# Patient Record
Sex: FEMALE | Race: WHITE | Employment: UNEMPLOYED | URBAN - METROPOLITAN AREA
[De-identification: names, ages, dates, MRNs, and addresses within clinical notes are randomized per-mention and may not be internally consistent; named-entity substitution may affect disease eponyms.]

---

## 2023-11-26 DIAGNOSIS — Z00.00 HEALTHCARE MAINTENANCE: Primary | ICD-10-CM

## 2023-11-27 ENCOUNTER — TELEPHONE (OUTPATIENT)
Dept: GASTROENTEROLOGY | Facility: CLINIC | Age: 48
End: 2023-11-27
Payer: COMMERCIAL

## 2023-11-27 DIAGNOSIS — K51.90 ULCERATIVE COLITIS WITHOUT COMPLICATIONS, UNSPECIFIED LOCATION (MULTI): Primary | ICD-10-CM

## 2023-11-27 RX ORDER — NITROFURANTOIN MACROCRYSTALS 50 MG/1
CAPSULE ORAL
Qty: 30 CAPSULE | Refills: 5 | Status: SHIPPED | OUTPATIENT
Start: 2023-11-27

## 2023-11-28 RX ORDER — MESALAMINE 4 G/60ML
4 SUSPENSION RECTAL NIGHTLY
Qty: 1800 ML | Refills: 1 | Status: SHIPPED | OUTPATIENT
Start: 2023-11-28 | End: 2024-01-16 | Stop reason: SDUPTHER

## 2023-11-28 RX ORDER — MESALAMINE 400 MG/1
800 CAPSULE, DELAYED RELEASE ORAL 3 TIMES DAILY
Qty: 180 CAPSULE | Refills: 1 | Status: SHIPPED | OUTPATIENT
Start: 2023-11-28 | End: 2023-12-28 | Stop reason: SDUPTHER

## 2023-12-04 ENCOUNTER — TELEPHONE (OUTPATIENT)
Dept: GASTROENTEROLOGY | Facility: CLINIC | Age: 48
End: 2023-12-04
Payer: COMMERCIAL

## 2023-12-04 NOTE — TELEPHONE ENCOUNTER
Pt states that she is having a flare up colitis. She is currently taking Mesalamine and states it is not enough. It is not working. She states that she is having blood and mucus and is taking 6 pills a day. She wants to know what could be done? She has an appt 1/16.

## 2023-12-28 DIAGNOSIS — K51.90 ULCERATIVE COLITIS WITHOUT COMPLICATIONS, UNSPECIFIED LOCATION (MULTI): ICD-10-CM

## 2023-12-29 RX ORDER — MESALAMINE 400 MG/1
800 CAPSULE, DELAYED RELEASE ORAL 3 TIMES DAILY
Qty: 180 CAPSULE | Refills: 0 | Status: SHIPPED | OUTPATIENT
Start: 2023-12-29 | End: 2024-01-16 | Stop reason: SDUPTHER

## 2024-01-16 ENCOUNTER — OFFICE VISIT (OUTPATIENT)
Dept: GASTROENTEROLOGY | Facility: CLINIC | Age: 49
End: 2024-01-16
Payer: COMMERCIAL

## 2024-01-16 VITALS — HEIGHT: 70 IN | WEIGHT: 158 LBS | BODY MASS INDEX: 22.62 KG/M2

## 2024-01-16 DIAGNOSIS — K51.90 ULCERATIVE COLITIS WITHOUT COMPLICATIONS, UNSPECIFIED LOCATION (MULTI): ICD-10-CM

## 2024-01-16 PROCEDURE — 99214 OFFICE O/P EST MOD 30 MIN: CPT | Performed by: INTERNAL MEDICINE

## 2024-01-16 RX ORDER — MESALAMINE 400 MG/1
800 CAPSULE, DELAYED RELEASE ORAL 3 TIMES DAILY
Qty: 540 CAPSULE | Refills: 3 | Status: SHIPPED | OUTPATIENT
Start: 2024-01-16 | End: 2025-01-15

## 2024-01-16 RX ORDER — MESALAMINE 4 G/60ML
4 SUSPENSION RECTAL NIGHTLY
Qty: 1800 ML | Refills: 5 | Status: SHIPPED | OUTPATIENT
Start: 2024-01-16 | End: 2024-07-14

## 2024-01-16 RX ORDER — BUDESONIDE 9 MG/1
9 TABLET, FILM COATED, EXTENDED RELEASE ORAL DAILY
Qty: 30 TABLET | Refills: 1 | Status: SHIPPED | OUTPATIENT
Start: 2024-01-16 | End: 2024-03-15 | Stop reason: SDUPTHER

## 2024-01-16 ASSESSMENT — ENCOUNTER SYMPTOMS
ROS GI COMMENTS: AS PER HPI
MYALGIAS: 0
FATIGUE: 0
UNEXPECTED WEIGHT CHANGE: 0
DIFFICULTY URINATING: 0
HEADACHES: 0
FEVER: 0
ARTHRALGIAS: 0
CHILLS: 0
SHORTNESS OF BREATH: 0

## 2024-01-16 NOTE — PATIENT INSTRUCTIONS
I renewed the mesalamine enemas and pills.    Try the Uceris (budesonide) for at least 30 days to see if this will improve symptoms even further.    We can talk about switching to a medication like Entyvio if symptoms don't get better.

## 2024-01-16 NOTE — PROGRESS NOTES
Assessment/Plan    Yaneth Cordero is a 48 y.o. female who presents to GI clinic for follow up for ulcerative colitis.    Ulcerative colitis without complications (CMS/HCC)  She had been stable on mesalamine (Delzicol) until around a month ago when she developed a flare, with bloody stools and tenesmus with urgency.  We started mesalamine enemas on top of the Delzicol, which has helped symptomatically.  However she has noticed that she has been able to tolerate fewer and fewer foods, and is wondering if this is related to uncontrolled UC, which is certainly possible.  She asked about Entyvio, and we discussed the differences between Delzicol and Entyvio.  At this point I am unsure if her ongoing trouble tolerating many foods is due to active UC, or could be due to IBS symptoms or just food intolerance in the setting of controlled UC.  We discussed doing a course of Uceris (colon-acting budesonide) to see if this helps to improve her symptoms further.  - wrote script for Uceris 9mg ER x30 days with one refill  - renewed Delzicol and also mesalamine enemas  - she will update us after a month on the Uceris to see if there is any improvement in her symptoms  - if she does have recurrence of symptoms after finishing Uceris, then we could discuss changing therapy to a medication such as Entyvio         Subjective     History of Present Illness   Yaneth Cordero is a 48 y.o. female with PMHx of UC, frequent UTIs, congenital kidney disease s/p bladder reconstruction who presents to GI clinic for follow up.  Last seen over a year ago (Nov 2022) to establish care for her UC after her previous GI doctor left the system.  At that time she was doing fairly well on Delzicol and mesalamine suppositories, with some mild symptoms of urgency.  She called last month due to concern for flare (was only on Delzicol at the time) so we sent in mesalamine enemas (suppositories were not covered by insurance)  Enemas helped  immensely  Took for a month, just finishing this and is feeling better  However has noticed that more foods are bothering her stomach and causing symptoms  It feels like she is able to tolerate fewer and fewer foods - lately has been eating mostly the same things such as rice and chicken  She saw online that lots of people are taking Entyvio and doing well, so she asked about this - we discussed the differences between medications like mesalamine and the biologics/infusion medications    Past history:  Diagnosed around 2015, took mesalamine for 6 months then stopped  Has been on Delzicol - takes 4 per day, then 6 if she is having a flare (but script is only for 4 per day)  Also uses suppositories when she is having a flare  Symptoms were very bad last year - had been eating bagged lettuce, also garlic sets off the symptoms  Flare = rectal pressure with mucous and blood  Can get pain depending on what she eats  Last flare has been slight, going on for around 3 weeks, combination of Delzicol and mesalamine suppositories seems to be helping  Colonoscopy 7/2020 ([facility]) was endoscopically normal, biopsies showed only one area of very minimal inactive inflammation  Last saw Dr. Gustafson 2 months ago due to ongoing symptoms despite Delzicol, stool tests were ordered but were not done - they also discussed adding mesalamine suppositories    Review of Systems  Review of Systems   Constitutional:  Negative for chills, fatigue, fever and unexpected weight change.   Respiratory:  Negative for shortness of breath.    Cardiovascular:  Negative for chest pain.   Gastrointestinal:         As per HPI   Genitourinary:  Negative for difficulty urinating.   Musculoskeletal:  Negative for arthralgias and myalgias.   Neurological:  Negative for headaches.   All other systems reviewed and are negative.      Allergies  Allergies   Allergen Reactions    Penicillins Rash       Medications  Current Outpatient Medications   Medication  "Instructions    budesonide ER (UCERIS) 9 mg, oral, Daily    mesalamine (DELZICOL) 800 mg, oral, 3 times daily    mesalamine (ROWASA) 4 g, rectal, Nightly, Retain as long as possible, up to 8 hours    nitrofurantoin (Macrodantin) 50 mg capsule TAKE ONE CAPSULE BY MOUTH AS DIRECTED AFTER INTERCOURSE        Objective     Visit Vitals  Ht 1.778 m (5' 10\")   Wt 71.7 kg (158 lb)   BMI 22.67 kg/m²   BSA 1.88 m²       Physical Exam  Constitutional:       General: She is not in acute distress.  Eyes:      Extraocular Movements: Extraocular movements intact.   Skin:     General: Skin is warm and dry.   Neurological:      General: No focal deficit present.      Mental Status: She is alert.   Psychiatric:         Mood and Affect: Mood normal.         Behavior: Behavior normal.           Lab Results   Component Value Date    WBC 9.2 12/27/2022    WBC 6.8 06/14/2021    HGB 14.0 12/27/2022    HGB 13.8 06/14/2021    HCT 43.6 12/27/2022    HCT 43.3 06/14/2021    MCV 95 12/27/2022    MCV 98 06/14/2021     12/27/2022     06/14/2021     Lab Results   Component Value Date     12/27/2022     06/14/2021    K 4.1 12/27/2022    K 4.9 06/14/2021     12/27/2022     06/14/2021    CO2 30 12/27/2022    CO2 28 06/14/2021    BUN 15 12/27/2022    BUN 18 06/14/2021    CREATININE 0.80 12/27/2022    CREATININE 0.74 06/14/2021    CALCIUM 10.0 12/27/2022    CALCIUM 10.2 06/14/2021    PROT 7.4 12/27/2022    PROT 6.9 06/14/2021    BILITOT 1.1 12/27/2022    BILITOT 0.6 06/14/2021    ALKPHOS 59 12/27/2022    ALKPHOS 56 06/14/2021    ALT 16 12/27/2022    ALT 28 06/14/2021    AST 19 12/27/2022    AST 22 06/14/2021    GLUCOSE 101 (H) 12/27/2022    GLUCOSE 88 06/14/2021       Recent Imaging  No results found.      Jeffery Rodriguez MD         "

## 2024-01-17 PROBLEM — K51.90 ULCERATIVE COLITIS WITHOUT COMPLICATIONS (MULTI): Status: ACTIVE | Noted: 2024-01-17

## 2024-01-17 NOTE — ASSESSMENT & PLAN NOTE
She had been stable on mesalamine (Delzicol) until around a month ago when she developed a flare, with bloody stools and tenesmus with urgency.  We started mesalamine enemas on top of the Delzicol, which has helped symptomatically.  However she has noticed that she has been able to tolerate fewer and fewer foods, and is wondering if this is related to uncontrolled UC, which is certainly possible.  She asked about Entyvio, and we discussed the differences between Delzicol and Entyvio.  At this point I am unsure if her ongoing trouble tolerating many foods is due to active UC, or could be due to IBS symptoms or just food intolerance in the setting of controlled UC.  We discussed doing a course of Uceris (colon-acting budesonide) to see if this helps to improve her symptoms further.  - wrote script for Uceris 9mg ER x30 days with one refill  - renewed Delzicol and also mesalamine enemas  - she will update us after a month on the Uceris to see if there is any improvement in her symptoms  - if she does have recurrence of symptoms after finishing Uceris, then we could discuss changing therapy to a medication such as Entyvio

## 2024-03-15 DIAGNOSIS — K51.90 ULCERATIVE COLITIS WITHOUT COMPLICATIONS, UNSPECIFIED LOCATION (MULTI): ICD-10-CM

## 2024-03-15 RX ORDER — BUDESONIDE 9 MG/1
9 TABLET, FILM COATED, EXTENDED RELEASE ORAL DAILY
Qty: 90 TABLET | Refills: 0 | Status: SHIPPED | OUTPATIENT
Start: 2024-03-15 | End: 2024-06-13

## 2025-03-23 DIAGNOSIS — K51.90 ULCERATIVE COLITIS WITHOUT COMPLICATIONS, UNSPECIFIED LOCATION (MULTI): ICD-10-CM

## 2025-03-27 ENCOUNTER — OFFICE VISIT (OUTPATIENT)
Dept: GASTROENTEROLOGY | Facility: CLINIC | Age: 50
End: 2025-03-27
Payer: COMMERCIAL

## 2025-03-27 VITALS — HEIGHT: 70 IN | OXYGEN SATURATION: 97 % | BODY MASS INDEX: 21.47 KG/M2 | HEART RATE: 73 BPM | WEIGHT: 150 LBS

## 2025-03-27 DIAGNOSIS — K51.90 ULCERATIVE COLITIS WITHOUT COMPLICATIONS, UNSPECIFIED LOCATION (MULTI): Primary | ICD-10-CM

## 2025-03-27 DIAGNOSIS — R14.1 GAS PAIN: ICD-10-CM

## 2025-03-27 PROCEDURE — 3008F BODY MASS INDEX DOCD: CPT

## 2025-03-27 PROCEDURE — 99214 OFFICE O/P EST MOD 30 MIN: CPT

## 2025-03-27 PROCEDURE — 1036F TOBACCO NON-USER: CPT

## 2025-03-27 RX ORDER — FAMOTIDINE 40 MG/1
40 TABLET, FILM COATED ORAL DAILY
Qty: 30 TABLET | Refills: 11 | Status: SHIPPED | OUTPATIENT
Start: 2025-03-27 | End: 2026-03-27

## 2025-03-27 RX ORDER — MESALAMINE 4 G/60ML
4 SUSPENSION RECTAL NIGHTLY
Qty: 1800 ML | Refills: 5 | Status: SHIPPED | OUTPATIENT
Start: 2025-03-27 | End: 2025-09-23

## 2025-03-27 RX ORDER — SODIUM, POTASSIUM,MAG SULFATES 17.5-3.13G
1 SOLUTION, RECONSTITUTED, ORAL ORAL ONCE
Qty: 1 EACH | Refills: 0 | Status: SHIPPED | OUTPATIENT
Start: 2025-03-27 | End: 2025-03-27

## 2025-03-27 RX ORDER — MESALAMINE 400 MG/1
800 CAPSULE, DELAYED RELEASE ORAL 3 TIMES DAILY
Qty: 180 CAPSULE | Refills: 11 | Status: SHIPPED | OUTPATIENT
Start: 2025-03-27 | End: 2026-03-27

## 2025-03-27 ASSESSMENT — ENCOUNTER SYMPTOMS
FATIGUE: 0
ANAL BLEEDING: 0
RECTAL PAIN: 0
TROUBLE SWALLOWING: 0
CHILLS: 0
ABDOMINAL PAIN: 1
COUGH: 0
CONSTIPATION: 0
NAUSEA: 0
ABDOMINAL DISTENTION: 1
SHORTNESS OF BREATH: 0
BLOOD IN STOOL: 0
VOMITING: 0
APPETITE CHANGE: 0
FEVER: 0
DIARRHEA: 0

## 2025-03-27 NOTE — ASSESSMENT & PLAN NOTE
Gas pain with epigastric/LUQ pain likely correlates with GERD.  Consider IBS or SIBO  -40 mg Pepcid daily  -Consider PPI or hydrogen breath test

## 2025-03-27 NOTE — ASSESSMENT & PLAN NOTE
-Continue current dose of mesalamine 800 mg 3 times daily  -As needed mesalamine enemas  -CBC, CMP, CRP, ESR ordered  -Schedule colonoscopy    Follow-up annually or as needed

## 2025-03-27 NOTE — PROGRESS NOTES
Subjective     History of Present Illness:   Yaneth Cordero is a 49 y.o. female with PMHx of ulcerative colitis who presents to GI clinic for medication refill    Today, patient states she feels okay on current medications.  Still has a lot of gas and bloating, constant.  Takes gas-x up to 8 pills daily which does not provide relief.  Tried Imodium w/ gas relief which did slightly help.  Eats and has gas immediately. Moves bowels daily with one formed stool. Gets full easily.  Denies constipation, diarrhea, melena, hematochezia, dysphagia, unintentional weight loss    Social ETOH, denies smoking or marijuana  Denies fxh GI cancer.  Fhx IBD- 1/2 sister crohn's  Abdominal Surgeries: bladder surgery    Last colonoscopy 7/2020 Dr. Gustafson for UC pancolitis: Unremarkable.  Repeat 1 year.  Negative pathology  Colonoscopy 5/2015: Ulcerative colitis rectum to 35 cm  No history of EGD       Past Medical History  As per HPI.     Social History  she  reports that she has never smoked. She has never used smokeless tobacco. She reports current alcohol use. She reports that she does not use drugs.     Family History  her family history is not on file.     Review of Systems  Review of Systems   Constitutional:  Negative for appetite change, chills, fatigue and fever.   HENT:  Negative for trouble swallowing.    Respiratory:  Negative for cough and shortness of breath.    Gastrointestinal:  Positive for abdominal distention and abdominal pain. Negative for anal bleeding, blood in stool, constipation, diarrhea, nausea, rectal pain and vomiting.       Allergies  Allergies   Allergen Reactions    Penicillins Rash       Medications  Current Outpatient Medications   Medication Instructions    budesonide ER (UCERIS) 9 mg, oral, Daily    famotidine (PEPCID) 40 mg, oral, Daily    mesalamine (DELZICOL) 800 mg, oral, 3 times daily    mesalamine (ROWASA) 4 g, rectal, Nightly, Retain as long as possible, up to 8 hours    nitrofurantoin  (Macrodantin) 50 mg capsule TAKE ONE CAPSULE BY MOUTH AS DIRECTED AFTER INTERCOURSE    sodium,potassium,mag sulfates (Suprep Bowel Prep Kit) 17.5-3.13-1.6 gram solution 2 bottles, oral, Once, Take as directed.        Objective   Visit Vitals  Pulse 73      Physical Exam  Constitutional:       Appearance: Normal appearance. She is normal weight.   HENT:      Mouth/Throat:      Mouth: Mucous membranes are dry.      Pharynx: Oropharynx is clear.   Cardiovascular:      Rate and Rhythm: Normal rate and regular rhythm.   Pulmonary:      Effort: Pulmonary effort is normal.      Breath sounds: Normal breath sounds. No wheezing or rhonchi.   Abdominal:      General: Abdomen is flat. Bowel sounds are normal. There is no distension.      Palpations: Abdomen is soft. There is no hepatomegaly.      Tenderness: There is abdominal tenderness (Epigastric, LUQ). There is no guarding or rebound. Negative signs include Stewart's sign.      Hernia: No hernia is present.   Musculoskeletal:         General: Normal range of motion.   Skin:     General: Skin is warm and dry.   Neurological:      General: No focal deficit present.      Mental Status: She is alert and oriented to person, place, and time.   Psychiatric:         Mood and Affect: Mood normal.         Behavior: Behavior normal.           Lab Results   Component Value Date    WBC 9.2 12/27/2022    WBC 6.8 06/14/2021    WBC 7.3 05/07/2020    HGB 14.0 12/27/2022    HGB 13.8 06/14/2021    HGB 13.2 05/07/2020    HCT 43.6 12/27/2022    HCT 43.3 06/14/2021    HCT 39.5 05/07/2020     12/27/2022     06/14/2021     05/07/2020     Lab Results   Component Value Date     12/27/2022     06/14/2021     05/07/2020    K 4.1 12/27/2022    K 4.9 06/14/2021    K 4.3 05/07/2020     12/27/2022     06/14/2021     05/07/2020    CO2 30 12/27/2022    CO2 28 06/14/2021    CO2 31 05/07/2020    BUN 15 12/27/2022    BUN 18 06/14/2021    BUN 14  05/07/2020    CREATININE 0.80 12/27/2022    CREATININE 0.74 06/14/2021    CREATININE 0.75 05/07/2020    CALCIUM 10.0 12/27/2022    CALCIUM 10.2 06/14/2021    CALCIUM 9.7 05/07/2020    PROT 7.4 12/27/2022    PROT 6.9 06/14/2021    PROT 7.0 05/07/2020    BILITOT 1.1 12/27/2022    BILITOT 0.6 06/14/2021    BILITOT 0.7 05/07/2020    ALKPHOS 59 12/27/2022    ALKPHOS 56 06/14/2021    ALKPHOS 44 05/07/2020    ALT 16 12/27/2022    ALT 28 06/14/2021    ALT 20 05/07/2020    AST 19 12/27/2022    AST 22 06/14/2021    AST 18 05/07/2020    GLUCOSE 101 (H) 12/27/2022    GLUCOSE 88 06/14/2021    GLUCOSE 79 05/07/2020           Yaneth Cordero is a 49 y.o. female who presents to GI clinic for gas pain and UC.    Ulcerative colitis without complications (Multi)  -Continue current dose of mesalamine 800 mg 3 times daily  -As needed mesalamine enemas  -CBC, CMP, CRP, ESR ordered  -Schedule colonoscopy    Follow-up annually or as needed    Gas pain  Gas pain with epigastric/LUQ pain likely correlates with GERD.  Consider IBS or SIBO  -40 mg Pepcid daily  -Consider PPI or hydrogen breath test         Deisy Valdez, APRN-CNP

## 2025-03-28 LAB
ALBUMIN SERPL-MCNC: 4.8 G/DL (ref 3.6–5.1)
ALP SERPL-CCNC: 55 U/L (ref 31–125)
ALT SERPL-CCNC: 17 U/L (ref 6–29)
ANION GAP SERPL CALCULATED.4IONS-SCNC: 6 MMOL/L (CALC) (ref 7–17)
AST SERPL-CCNC: 21 U/L (ref 10–35)
BILIRUB SERPL-MCNC: 0.4 MG/DL (ref 0.2–1.2)
BUN SERPL-MCNC: 14 MG/DL (ref 7–25)
CALCIUM SERPL-MCNC: 9.6 MG/DL (ref 8.6–10.2)
CHLORIDE SERPL-SCNC: 102 MMOL/L (ref 98–110)
CO2 SERPL-SCNC: 31 MMOL/L (ref 20–32)
CREAT SERPL-MCNC: 0.73 MG/DL (ref 0.5–0.99)
CRP SERPL-MCNC: <3 MG/L
EGFRCR SERPLBLD CKD-EPI 2021: 102 ML/MIN/1.73M2
ERYTHROCYTE [DISTWIDTH] IN BLOOD BY AUTOMATED COUNT: 11.7 % (ref 11–15)
ERYTHROCYTE [SEDIMENTATION RATE] IN BLOOD BY WESTERGREN METHOD: 14 MM/H
GLUCOSE SERPL-MCNC: 80 MG/DL (ref 65–99)
HCT VFR BLD AUTO: 39.8 % (ref 35–45)
HGB BLD-MCNC: 13.1 G/DL (ref 11.7–15.5)
MCH RBC QN AUTO: 31.4 PG (ref 27–33)
MCHC RBC AUTO-ENTMCNC: 32.9 G/DL (ref 32–36)
MCV RBC AUTO: 95.4 FL (ref 80–100)
PLATELET # BLD AUTO: 216 THOUSAND/UL (ref 140–400)
PMV BLD REES-ECKER: 10.8 FL (ref 7.5–12.5)
POTASSIUM SERPL-SCNC: 4.6 MMOL/L (ref 3.5–5.3)
PROT SERPL-MCNC: 7.2 G/DL (ref 6.1–8.1)
RBC # BLD AUTO: 4.17 MILLION/UL (ref 3.8–5.1)
SODIUM SERPL-SCNC: 139 MMOL/L (ref 135–146)
WBC # BLD AUTO: 8.8 THOUSAND/UL (ref 3.8–10.8)

## 2025-03-31 RX ORDER — MESALAMINE 400 MG/1
800 CAPSULE, DELAYED RELEASE ORAL 3 TIMES DAILY
Qty: 540 CAPSULE | Refills: 3 | OUTPATIENT
Start: 2025-03-31 | End: 2026-03-31

## 2025-04-09 ENCOUNTER — APPOINTMENT (OUTPATIENT)
Dept: GASTROENTEROLOGY | Facility: CLINIC | Age: 50
End: 2025-04-09
Payer: COMMERCIAL

## 2025-04-14 DIAGNOSIS — R14.1 GAS PAIN: Primary | ICD-10-CM

## 2025-04-14 RX ORDER — PANTOPRAZOLE SODIUM 40 MG/1
40 TABLET, DELAYED RELEASE ORAL DAILY
Qty: 30 TABLET | Refills: 11 | Status: SHIPPED | OUTPATIENT
Start: 2025-04-14 | End: 2026-04-14

## 2025-04-14 RX ORDER — FAMOTIDINE 40 MG/1
40 TABLET, FILM COATED ORAL 2 TIMES DAILY PRN
Qty: 30 TABLET | Refills: 11 | Status: SHIPPED | OUTPATIENT
Start: 2025-04-14 | End: 2026-04-14

## 2025-08-25 ENCOUNTER — TELEPHONE (OUTPATIENT)
Dept: GASTROENTEROLOGY | Facility: CLINIC | Age: 50
End: 2025-08-25
Payer: COMMERCIAL